# Patient Record
Sex: MALE | Race: WHITE | NOT HISPANIC OR LATINO | Employment: FULL TIME | URBAN - METROPOLITAN AREA
[De-identification: names, ages, dates, MRNs, and addresses within clinical notes are randomized per-mention and may not be internally consistent; named-entity substitution may affect disease eponyms.]

---

## 2019-01-06 ENCOUNTER — OFFICE VISIT (OUTPATIENT)
Dept: URGENT CARE | Facility: CLINIC | Age: 44
End: 2019-01-06
Payer: COMMERCIAL

## 2019-01-06 VITALS
HEIGHT: 72 IN | SYSTOLIC BLOOD PRESSURE: 136 MMHG | RESPIRATION RATE: 18 BRPM | WEIGHT: 195 LBS | OXYGEN SATURATION: 98 % | DIASTOLIC BLOOD PRESSURE: 85 MMHG | BODY MASS INDEX: 26.41 KG/M2 | TEMPERATURE: 96.4 F | HEART RATE: 79 BPM

## 2019-01-06 DIAGNOSIS — Z23 NEED FOR TETANUS BOOSTER: ICD-10-CM

## 2019-01-06 DIAGNOSIS — S61.219A LACERATION WITHOUT FOREIGN BODY OF UNSPECIFIED FINGER WITHOUT DAMAGE TO NAIL, INITIAL ENCOUNTER: Primary | ICD-10-CM

## 2019-01-06 PROCEDURE — 12001 RPR S/N/AX/GEN/TRNK 2.5CM/<: CPT | Performed by: FAMILY MEDICINE

## 2019-01-06 PROCEDURE — 90715 TDAP VACCINE 7 YRS/> IM: CPT

## 2019-01-06 PROCEDURE — 99204 OFFICE O/P NEW MOD 45 MIN: CPT | Performed by: FAMILY MEDICINE

## 2019-01-06 NOTE — PROGRESS NOTES
Assessment/Plan:    2 cm V shaped flap type laceration tip of left index finger  Diagnoses and all orders for this visit:    Laceration without foreign body of unspecified finger without damage to nail, initial encounter  -     Laceration repair    Need for tetanus booster  -     TDAP Vaccine greater than or equal to 6yo          Subjective:      Patient ID: Chucky Quintero is a 44 y o  male      HPI    The following portions of the patient's history were reviewed and updated as appropriate: allergies, current medications, past family history, past medical history, past social history, past surgical history and problem list     Review of Systems      Objective:      /85 (BP Location: Right arm, Patient Position: Sitting, Cuff Size: Adult)   Pulse 79   Temp (!) 96 4 °F (35 8 °C) (Tympanic)   Resp 18   Ht 6' (1 829 m)   Wt 88 5 kg (195 lb)   SpO2 98%   BMI 26 45 kg/m²          Physical Exam

## 2019-01-06 NOTE — PROGRESS NOTES
Laceration repair  Date/Time: 1/6/2019 1:48 PM  Performed by: Ryne Mcmillan  Authorized by: Ryne Mcmillan   Consent: Verbal consent obtained  Consent given by: patient  Body area: upper extremity  Location details: left index finger  Foreign bodies: no foreign bodies  Tendon involvement: none  Nerve involvement: none  Vascular damage: no    Sedation:  Patient sedated: no    Wound Dehiscence:  Superficial Wound Dehiscence: simple closure      Procedure Details:  Irrigation solution: saline  Amount of cleaning: standard  Debridement: none  Degree of undermining: none  Skin closure: glue  Approximation: close  Approximation difficulty: simple  Dressing: antibiotic ointment and 4x4 sterile gauze  Patient tolerance: Patient tolerated the procedure well with no immediate complications  Comments: Patient suffered a flap-type laceration to the tip of the left index finger  The wound was cleansed with Betadine  Home is stasis was achieved with pressure  The wound was closed with skin glue  Antibiotic ointment was applied and a bulky dressing  Home instructions were given  Follow up here for any problems  Tetanus vaccine was administered

## 2022-02-09 ENCOUNTER — OFFICE VISIT (OUTPATIENT)
Dept: URGENT CARE | Facility: CLINIC | Age: 47
End: 2022-02-09

## 2022-02-09 ENCOUNTER — HOSPITAL ENCOUNTER (EMERGENCY)
Facility: HOSPITAL | Age: 47
Discharge: HOME | End: 2022-02-09
Attending: EMERGENCY MEDICINE
Payer: COMMERCIAL

## 2022-02-09 ENCOUNTER — APPOINTMENT (OUTPATIENT)
Dept: RADIOLOGY | Facility: CLINIC | Age: 47
End: 2022-02-09

## 2022-02-09 ENCOUNTER — APPOINTMENT (EMERGENCY)
Dept: RADIOLOGY | Facility: HOSPITAL | Age: 47
End: 2022-02-09
Payer: COMMERCIAL

## 2022-02-09 VITALS
DIASTOLIC BLOOD PRESSURE: 72 MMHG | SYSTOLIC BLOOD PRESSURE: 118 MMHG | HEIGHT: 72 IN | TEMPERATURE: 97.2 F | RESPIRATION RATE: 14 BRPM | HEART RATE: 97 BPM | WEIGHT: 220 LBS | OXYGEN SATURATION: 98 % | BODY MASS INDEX: 29.8 KG/M2

## 2022-02-09 VITALS
WEIGHT: 226.19 LBS | BODY MASS INDEX: 30.64 KG/M2 | SYSTOLIC BLOOD PRESSURE: 128 MMHG | HEIGHT: 72 IN | RESPIRATION RATE: 18 BRPM | OXYGEN SATURATION: 95 % | DIASTOLIC BLOOD PRESSURE: 88 MMHG | TEMPERATURE: 97.6 F | HEART RATE: 80 BPM

## 2022-02-09 DIAGNOSIS — B34.9 VIRAL ILLNESS: Primary | ICD-10-CM

## 2022-02-09 DIAGNOSIS — J20.8 VIRAL BRONCHITIS: ICD-10-CM

## 2022-02-09 DIAGNOSIS — R07.9 CHEST PAIN, UNSPECIFIED TYPE: Primary | ICD-10-CM

## 2022-02-09 DIAGNOSIS — R07.89 CHEST DISCOMFORT: ICD-10-CM

## 2022-02-09 LAB
ATRIAL RATE: 79 BPM
ATRIAL RATE: 88 BPM
P AXIS: 53 DEGREES
P AXIS: 54 DEGREES
PR INTERVAL: 154 MS
PR INTERVAL: 164 MS
QRS AXIS: 35 DEGREES
QRS AXIS: 56 DEGREES
QRSD INTERVAL: 90 MS
QRSD INTERVAL: 90 MS
QT INTERVAL: 332 MS
QT INTERVAL: 346 MS
QTC INTERVAL: 396 MS
QTC INTERVAL: 401 MS
T WAVE AXIS: 65 DEGREES
T WAVE AXIS: 66 DEGREES
VENTRICULAR RATE: 79 BPM
VENTRICULAR RATE: 88 BPM

## 2022-02-09 PROCEDURE — 93005 ELECTROCARDIOGRAM TRACING: CPT | Performed by: PHYSICIAN ASSISTANT

## 2022-02-09 PROCEDURE — U0003 INFECTIOUS AGENT DETECTION BY NUCLEIC ACID (DNA OR RNA); SEVERE ACUTE RESPIRATORY SYNDROME CORONAVIRUS 2 (SARS-COV-2) (CORONAVIRUS DISEASE [COVID-19]), AMPLIFIED PROBE TECHNIQUE, MAKING USE OF HIGH THROUGHPUT TECHNOLOGIES AS DESCRIBED BY CMS-2020-01-R: HCPCS | Performed by: EMERGENCY MEDICINE

## 2022-02-09 PROCEDURE — U0005 INFEC AGEN DETEC AMPLI PROBE: HCPCS | Performed by: EMERGENCY MEDICINE

## 2022-02-09 PROCEDURE — 93010 ELECTROCARDIOGRAM REPORT: CPT | Performed by: PHYSICIAN ASSISTANT

## 2022-02-09 PROCEDURE — 93005 ELECTROCARDIOGRAM TRACING: CPT

## 2022-02-09 PROCEDURE — 71046 X-RAY EXAM CHEST 2 VIEWS: CPT

## 2022-02-09 PROCEDURE — 70360 X-RAY EXAM OF NECK: CPT

## 2022-02-09 PROCEDURE — 99285 EMERGENCY DEPT VISIT HI MDM: CPT

## 2022-02-09 PROCEDURE — 99285 EMERGENCY DEPT VISIT HI MDM: CPT | Performed by: EMERGENCY MEDICINE

## 2022-02-09 RX ORDER — PREDNISONE 20 MG/1
20 TABLET ORAL DAILY
Qty: 5 TABLET | Refills: 0 | Status: SHIPPED | OUTPATIENT
Start: 2022-02-09

## 2022-02-09 RX ORDER — ALBUTEROL SULFATE 90 UG/1
2 AEROSOL, METERED RESPIRATORY (INHALATION) EVERY 6 HOURS PRN
Qty: 8.5 G | Refills: 0 | Status: SHIPPED | OUTPATIENT
Start: 2022-02-09

## 2022-02-09 NOTE — ED PROVIDER NOTES
History  Chief Complaint   Patient presents with    Chest Pain     pt c/o chest tightness w/cough and sob starting this morning  hx bronchitispt seen at urgent care prior and instructed to come to ED per further evaluation  denies travel/fevers/n/v/d      Patient is a very pleasant 67-year-old male presents emergency room complaining of cough and tightness in his throat  Initially was reported to be chest tightness is being relate to me as a sensation in the patient's throat in the area of his lower throat/sternal notch  Patient denies any chest pressure to me  There is no radiation of pain  He has not been diaphoretic  He has had no nausea vomiting  He does report that when he woke this morning he felt as if he was wheezing and when asked further about this he relates it as coming from the area of his throat  He also felt as if he was slightly short of breath  Of note, the patient reports that this is exactly similar to other episodes of bronchitis that he has had in the past       History provided by:  Patient  Sore Throat  Location:  Anterior  Quality:  Aching and sharp  Severity:  Mild  Onset quality:  Gradual  Timing:  Constant  Progression:  Waxing and waning  Chronicity:  New  Relieved by:  None tried  Worsened by:  Drinking, eating and swallowing  Associated symptoms: cough and stridor    Associated symptoms: no chest pain, no chills, no rhinorrhea and no shortness of breath        Prior to Admission Medications   Prescriptions Last Dose Informant Patient Reported? Taking?    albuterol (ProAir HFA) 90 mcg/act inhaler   No Yes   Sig: Inhale 2 puffs every 6 (six) hours as needed (acute bronchitis)      Facility-Administered Medications: None       Past Medical History:   Diagnosis Date    Bronchitis     Known health problems: none        Past Surgical History:   Procedure Laterality Date    KNEE SURGERY         Family History   Problem Relation Age of Onset    No Known Problems Mother     No Known Problems Father      I have reviewed and agree with the history as documented  E-Cigarette/Vaping    E-Cigarette Use Never User      E-Cigarette/Vaping Substances     Social History     Tobacco Use    Smoking status: Never Smoker    Smokeless tobacco: Never Used   Vaping Use    Vaping Use: Never used   Substance Use Topics    Alcohol use: Yes    Drug use: Never       Review of Systems   Constitutional: Negative  Negative for appetite change, chills and diaphoresis  HENT: Positive for sore throat  Negative for congestion, rhinorrhea, sinus pressure and sinus pain  Eyes: Negative for photophobia, pain and visual disturbance  Respiratory: Positive for cough, wheezing and stridor  Negative for choking, chest tightness and shortness of breath  Cardiovascular: Negative for chest pain  Gastrointestinal: Negative  Genitourinary: Negative  Musculoskeletal: Negative  All other systems reviewed and are negative  Physical Exam  Physical Exam  Vitals and nursing note reviewed  Constitutional:       General: He is not in acute distress  Appearance: Normal appearance  He is well-developed  He is not ill-appearing or toxic-appearing  HENT:      Head: Normocephalic and atraumatic  Hair is normal       Jaw: No pain on movement  Comments: No subq air or crepitus      Right Ear: External ear normal       Left Ear: External ear normal       Nose: Nose normal       Mouth/Throat:      Mouth: Mucous membranes are moist       Pharynx: Oropharynx is clear  Uvula midline  Comments: Very minimal uvula angioedema  Eyes:      General: Lids are normal  No scleral icterus  Extraocular Movements: Extraocular movements intact  Conjunctiva/sclera: Conjunctivae normal       Pupils: Pupils are equal, round, and reactive to light  Cardiovascular:      Rate and Rhythm: Normal rate and regular rhythm  Heart sounds: Normal heart sounds  No murmur heard        Pulmonary:      Effort: Pulmonary effort is normal  No respiratory distress  Breath sounds: Normal breath sounds  No decreased breath sounds, wheezing, rhonchi or rales  Abdominal:      General: Abdomen is flat  There is no distension  Palpations: Abdomen is soft  Abdomen is not rigid  Tenderness: There is no abdominal tenderness  Musculoskeletal:         General: No deformity or signs of injury  Normal range of motion  Cervical back: Normal range of motion and neck supple  Skin:     General: Skin is warm and dry  Coloration: Skin is not pale  Findings: No rash  Neurological:      General: No focal deficit present  Mental Status: He is alert and oriented to person, place, and time  Mental status is at baseline  Psychiatric:         Attention and Perception: Attention normal          Mood and Affect: Mood normal          Speech: Speech normal          Behavior: Behavior normal          Vital Signs  ED Triage Vitals [02/09/22 1035]   Temperature Pulse Respirations Blood Pressure SpO2   97 6 °F (36 4 °C) 79 14 129/85 99 %      Temp Source Heart Rate Source Patient Position - Orthostatic VS BP Location FiO2 (%)   Temporal Monitor Lying Left arm --      Pain Score       2           Vitals:    02/09/22 1035 02/09/22 1045 02/09/22 1100 02/09/22 1145   BP: 129/85 137/90 134/91 128/88   Pulse: 79 81 104 80   Patient Position - Orthostatic VS: Lying Lying Lying Lying         Visual Acuity      ED Medications  Medications - No data to display    Diagnostic Studies  Results Reviewed     Procedure Component Value Units Date/Time    COVID only - 48 hour TAT [[id number]] Collected: 02/09/22 1101    Lab Status:  In process Specimen: Nares from Nose Updated: 02/09/22 1103                 XR neck soft tissue    (Results Pending)              Procedures  ECG 12 Lead Documentation Only    Date/Time: 2/9/2022 11:19 AM  Performed by: Andrea Martinez DO  Authorized by: Andrea Martinez DO     Indications / Diagnosis: Throat discomfort  ECG reviewed by me, the ED Provider: yes    Patient location:  ED  Previous ECG:     Previous ECG:  Unavailable    Comparison to cardiac monitor: No    Interpretation:     Interpretation: normal    Rate:     ECG rate assessment: normal    Rhythm:     Rhythm: sinus rhythm    Ectopy:     Ectopy: none    QRS:     QRS axis:  Normal  Conduction:     Conduction: normal    ST segments:     ST segments:  Normal  T waves:     T waves: normal               ED Course  ED Course as of 02/09/22 1213   Wed Feb 09, 2022   1115 Patient is able to lay flat without any respiratory distress  He is able to manage his own secretions and able to tolerate p o  Intake  There is no clinical findings to suggest an acute cardiac issue  EKG is normal   Chest x-ray performed today at [facility] was also negative  1211 Discussed with radiologist   Imaging is negative  1213 Findings are negative for acute process  Patient likely suffering from viral illness  Stable for discharge home  No further workup required at this time  We will call patient with results of his COVID testing                                               MDM  Number of Diagnoses or Management Options  Viral illness: new and requires workup     Amount and/or Complexity of Data Reviewed  Tests in the radiology section of CPT®: ordered and reviewed  Discussion of test results with the performing providers: yes    Risk of Complications, Morbidity, and/or Mortality  Presenting problems: moderate  Diagnostic procedures: low  Management options: low    Patient Progress  Patient progress: stable      Disposition  Final diagnoses:   Viral illness     Time reflects when diagnosis was documented in both MDM as applicable and the Disposition within this note     Time User Action Codes Description Comment    2/9/2022 11:39 AM Colten Seymour Add [B34 9] Viral illness       ED Disposition     ED Disposition Condition Date/Time Comment    Discharge Stable Wed Feb 9, 2022 11:38 AM Flor Gomez discharge to home/self care  Follow-up Information     Follow up With Specialties Details Why [facility],  General Practice   [address] [place] [address]  [phone]            Patient's Medications   Discharge Prescriptions    PREDNISONE 20 MG TABLET    Take 1 tablet (20 mg total) by mouth daily       Start Date: 2/9/2022  End Date: --       Order Dose: 20 mg       Quantity: 5 tablet    Refills: 0       No discharge procedures on file      PDMP Review     None          ED Provider  Electronically Signed by           Haile Ramos DO  02/09/22 9529

## 2022-02-09 NOTE — Clinical Note
Rizwan Pretty was seen and treated in our emergency department on 2/9/2022  Diagnosis:     Chun Jaeger  may return to work on return date  He may return on this date: 02/10/2022         If you have any questions or concerns, please don't hesitate to call        Diego Maldonado DO    ______________________________           _______________          _______________  Hospital Representative                              Date                                Time

## 2022-02-09 NOTE — DISCHARGE INSTRUCTIONS
Continue the albuterol as prescribed  We have also prescribed you a short course of steroids to help with the inflammation  Return with any worsening symptoms  Follow-up with your family doctor as needed      We will call you with the results of your COVID testing and the results are also available on [address]

## 2022-02-09 NOTE — PROGRESS NOTES
[facility]        NAME: Thierry Ramirze is a 56 y o  male  : 1975    MRN: [id number]  DATE: 2022  TIME: 10:06 AM    Assessment and Plan   Chest pain, unspecified type [R07 9]  1  Chest pain, unspecified type  albuterol (ProAir HFA) 90 mcg/act inhaler    ECG 12 lead    XR chest pa & lateral    Transfer to other facility   2  Viral bronchitis       Declined PCR COVID test      CXR: no acute cardiopulmonary disease   EKG: normal sinus without ST changes    Recommended further evaluation in the ER for abnormal pain at the base of the throat/superior aspect of chest  Pain is not reproducible and occurred suddenly during a coughing fit  He was not eating during this time and does not have a globus sensation  States the pain "is enough to make me want to avoid coughing"  Unable to rule out more serious etiology in Urgent Care  Patient's vital signs are stable at this time and EKG is normal  Therefore, patient may proceed to ER via private vehicle  Discussed risks of worsening of condition that could be life threatening  Patient Instructions       Follow up with PCP in 3-5 days  Proceed to  ER for further evaluation    Chief Complaint     Chief Complaint   Patient presents with    Cough     woke up with a cough with chest congestion and a deep pain in chest while coughing  Took a home covid test and it was negative  Had covid vaccines         History of Present Illness       Patient has received COVID vaccines  Reports negative home COVID test  Denies hospitalization in the past year, recent oral corticosteroid use, DM, prior stroke/serious neurologic condition or CHF  Denies cardiac hx  Grandfather  of MI  Cough  This is a new problem  The current episode started today  The cough is productive of sputum  Associated symptoms include chest pain (7-8/10 stabbing pain that occured suddenly after a coughing spell   Reports pain with coughing, "enough to make me want to avoid coughing"), shortness of breath and wheezing  Pertinent negatives include no chills, ear pain, fever, headaches, myalgias, postnasal drip, rash, rhinorrhea or sore throat  His past medical history is significant for bronchitis  There is no history of asthma or pneumonia  Review of Systems   Review of Systems   Constitutional: Negative for chills and fever  HENT: Negative for congestion, dental problem, ear discharge, ear pain, facial swelling, postnasal drip, rhinorrhea, sinus pressure, sinus pain, sneezing, sore throat and trouble swallowing  Eyes: Negative for itching  Respiratory: Positive for cough, shortness of breath and wheezing  Negative for chest tightness  Cardiovascular: Positive for chest pain (7-8/10 stabbing pain that occured suddenly after a coughing spell  Reports pain with coughing, "enough to make me want to avoid coughing")  Negative for palpitations  Gastrointestinal: Negative for abdominal pain, constipation, diarrhea, nausea and vomiting  Musculoskeletal: Negative for myalgias  Skin: Negative for rash  Neurological: Negative for dizziness, weakness, light-headedness and headaches           Current Medications       Current Outpatient Medications:     albuterol (ProAir HFA) 90 mcg/act inhaler, Inhale 2 puffs every 6 (six) hours as needed (acute bronchitis), Disp: 8 5 g, Rfl: 0    Current Allergies     Allergies as of 02/09/2022 - Reviewed 02/09/2022   Allergen Reaction Noted    Penicillins Rash 02/09/2022            The following portions of the patient's history were reviewed and updated as appropriate: allergies, current medications, past family history, past medical history, past social history, past surgical history and problem list      Past Medical History:   Diagnosis Date    Known health problems: none        Past Surgical History:   Procedure Laterality Date    KNEE SURGERY         Family History   Problem Relation Age of Onset    No Known Problems Mother  No Known Problems Father          Medications have been verified  Objective   /72 (BP Location: Right arm, Patient Position: Sitting)   Pulse 97   Temp (!) 97 2 °F (36 2 °C)   Resp 14   Ht 6' (1 829 m)   Wt 99 8 kg (220 lb)   SpO2 98%   BMI 29 84 kg/m²   No LMP for male patient  Physical Exam     Physical Exam  Vitals reviewed  Constitutional:       General: He is not in acute distress  Appearance: He is well-developed  He is not diaphoretic  HENT:      Head: Normocephalic  Right Ear: Tympanic membrane and external ear normal       Left Ear: Tympanic membrane and external ear normal       Nose: Nose normal       Mouth/Throat:      Pharynx: No oropharyngeal exudate or posterior oropharyngeal erythema  Eyes:      Conjunctiva/sclera: Conjunctivae normal    Neck:     Cardiovascular:      Rate and Rhythm: Normal rate and regular rhythm  Heart sounds: Normal heart sounds  No murmur heard  No friction rub  No gallop  Pulmonary:      Effort: Pulmonary effort is normal  No respiratory distress  Breath sounds: Normal breath sounds  No wheezing, rhonchi or rales  Comments: Discomfort is not reproducible   Lymphadenopathy:      Cervical: No cervical adenopathy  Skin:     General: Skin is warm  Neurological:      Mental Status: He is alert and oriented to person, place, and time  Psychiatric:         Behavior: Behavior normal          Thought Content:  Thought content normal          Judgment: Judgment normal

## 2022-02-10 LAB — SARS-COV-2 RNA RESP QL NAA+PROBE: NEGATIVE
